# Patient Record
Sex: FEMALE | Race: WHITE | NOT HISPANIC OR LATINO | URBAN - METROPOLITAN AREA
[De-identification: names, ages, dates, MRNs, and addresses within clinical notes are randomized per-mention and may not be internally consistent; named-entity substitution may affect disease eponyms.]

---

## 2021-08-17 ENCOUNTER — INPATIENT (INPATIENT)
Facility: HOSPITAL | Age: 66
LOS: 2 days | Discharge: HOME | End: 2021-08-20
Attending: INTERNAL MEDICINE | Admitting: INTERNAL MEDICINE
Payer: MEDICARE

## 2021-08-17 VITALS
DIASTOLIC BLOOD PRESSURE: 86 MMHG | TEMPERATURE: 98 F | OXYGEN SATURATION: 100 % | HEART RATE: 98 BPM | SYSTOLIC BLOOD PRESSURE: 127 MMHG | RESPIRATION RATE: 18 BRPM

## 2021-08-17 LAB
ALBUMIN SERPL ELPH-MCNC: 4.2 G/DL — SIGNIFICANT CHANGE UP (ref 3.3–5)
ALP SERPL-CCNC: 81 U/L — SIGNIFICANT CHANGE UP (ref 40–120)
ALT FLD-CCNC: 28 U/L — SIGNIFICANT CHANGE UP (ref 4–33)
ANION GAP SERPL CALC-SCNC: 16 MMOL/L — HIGH (ref 7–14)
AST SERPL-CCNC: 22 U/L — SIGNIFICANT CHANGE UP (ref 4–32)
BASOPHILS # BLD AUTO: 0 K/UL — SIGNIFICANT CHANGE UP (ref 0–0.2)
BASOPHILS NFR BLD AUTO: 0 % — SIGNIFICANT CHANGE UP (ref 0–2)
BILIRUB SERPL-MCNC: 0.6 MG/DL — SIGNIFICANT CHANGE UP (ref 0.2–1.2)
BLOOD GAS VENOUS COMPREHENSIVE RESULT: SIGNIFICANT CHANGE UP
BUN SERPL-MCNC: 31 MG/DL — HIGH (ref 7–23)
CALCIUM SERPL-MCNC: 11.1 MG/DL — HIGH (ref 8.4–10.5)
CHLORIDE SERPL-SCNC: 92 MMOL/L — LOW (ref 98–107)
CO2 SERPL-SCNC: 23 MMOL/L — SIGNIFICANT CHANGE UP (ref 22–31)
CREAT SERPL-MCNC: 1.08 MG/DL — SIGNIFICANT CHANGE UP (ref 0.5–1.3)
EOSINOPHIL # BLD AUTO: 0 K/UL — SIGNIFICANT CHANGE UP (ref 0–0.5)
EOSINOPHIL NFR BLD AUTO: 0 % — SIGNIFICANT CHANGE UP (ref 0–6)
GLUCOSE SERPL-MCNC: 226 MG/DL — HIGH (ref 70–99)
HCT VFR BLD CALC: 45 % — SIGNIFICANT CHANGE UP (ref 34.5–45)
HGB BLD-MCNC: 15.1 G/DL — SIGNIFICANT CHANGE UP (ref 11.5–15.5)
IANC: 20.25 K/UL — HIGH (ref 1.5–8.5)
LYMPHOCYTES # BLD AUTO: 2.52 K/UL — SIGNIFICANT CHANGE UP (ref 1–3.3)
LYMPHOCYTES # BLD AUTO: 9.7 % — LOW (ref 13–44)
MCHC RBC-ENTMCNC: 29.1 PG — SIGNIFICANT CHANGE UP (ref 27–34)
MCHC RBC-ENTMCNC: 33.6 GM/DL — SIGNIFICANT CHANGE UP (ref 32–36)
MCV RBC AUTO: 86.7 FL — SIGNIFICANT CHANGE UP (ref 80–100)
MONOCYTES # BLD AUTO: 1.38 K/UL — HIGH (ref 0–0.9)
MONOCYTES NFR BLD AUTO: 5.3 % — SIGNIFICANT CHANGE UP (ref 2–14)
NEUTROPHILS # BLD AUTO: 21.41 K/UL — HIGH (ref 1.8–7.4)
NEUTROPHILS NFR BLD AUTO: 82.3 % — HIGH (ref 43–77)
PLATELET # BLD AUTO: 435 K/UL — HIGH (ref 150–400)
POTASSIUM SERPL-MCNC: 4 MMOL/L — SIGNIFICANT CHANGE UP (ref 3.5–5.3)
POTASSIUM SERPL-SCNC: 4 MMOL/L — SIGNIFICANT CHANGE UP (ref 3.5–5.3)
PROT SERPL-MCNC: 6.7 G/DL — SIGNIFICANT CHANGE UP (ref 6–8.3)
RBC # BLD: 5.19 M/UL — SIGNIFICANT CHANGE UP (ref 3.8–5.2)
RBC # FLD: 13.4 % — SIGNIFICANT CHANGE UP (ref 10.3–14.5)
SODIUM SERPL-SCNC: 131 MMOL/L — LOW (ref 135–145)
WBC # BLD: 26.02 K/UL — HIGH (ref 3.8–10.5)
WBC # FLD AUTO: 26.02 K/UL — HIGH (ref 3.8–10.5)

## 2021-08-17 PROCEDURE — 71046 X-RAY EXAM CHEST 2 VIEWS: CPT | Mod: 26

## 2021-08-17 RX ORDER — MAGNESIUM SULFATE 500 MG/ML
2 VIAL (ML) INJECTION ONCE
Refills: 0 | Status: COMPLETED | OUTPATIENT
Start: 2021-08-17 | End: 2021-08-17

## 2021-08-17 RX ORDER — SODIUM CHLORIDE 9 MG/ML
2000 INJECTION, SOLUTION INTRAVENOUS ONCE
Refills: 0 | Status: COMPLETED | OUTPATIENT
Start: 2021-08-17 | End: 2021-08-17

## 2021-08-17 RX ORDER — PROCHLORPERAZINE MALEATE 5 MG
10 TABLET ORAL ONCE
Refills: 0 | Status: COMPLETED | OUTPATIENT
Start: 2021-08-17 | End: 2021-08-17

## 2021-08-17 RX ORDER — PROCHLORPERAZINE MALEATE 5 MG
10 TABLET ORAL ONCE
Refills: 0 | Status: DISCONTINUED | OUTPATIENT
Start: 2021-08-17 | End: 2021-08-17

## 2021-08-17 RX ADMIN — SODIUM CHLORIDE 2000 MILLILITER(S): 9 INJECTION, SOLUTION INTRAVENOUS at 20:05

## 2021-08-17 RX ADMIN — Medication 10 MILLIGRAM(S): at 20:47

## 2021-08-17 NOTE — ED PROVIDER NOTE - ATTENDING CONTRIBUTION TO CARE
agree with resident note    "66F hx Breast CA (on biologics) p/w nausea, vomiting. Reports intermittent episodes of n/v, typically post therapy (most recently several weeks ago). Endorses several days of nausea w/ inability to tolerate PO. Endorses mild diffuse abd pain a/w vomiting, + stooling nl + nl urination. Denies symptoms otherwise. Has not trialed antiemetics at home. Denies fevers."    PE: uncomfortable in pain, VSS, CTAB/L; s1 s2 no m/r/g abd soft/NT/ND ext: no edema    Imp: pt states has had this very frequently at least 3-4 times a year; abd benign; will check labs treat for N/V and reassess

## 2021-08-17 NOTE — ED PROVIDER NOTE - OBJECTIVE STATEMENT
66F hx Breast CA (on biologics) p/w nausea, vomiting. Reports intermittent episodes of n/v, typically post therapy (most recently several weeks ago). Endorses several days of nausea w/ inability to tolerate PO. Endorses mild diffuse abd pain a/w vomiting, + stooling nl + nl urination. Denies symptoms otherwise. Has not trialed antiemetics at home. Denies fevers. 66F hx Breast CA (on Herceptin, Perjeta) p/w nausea, vomiting. Reports intermittent episodes of n/v, typically post therapy (most recently several weeks ago). Endorses several days of nausea w/ inability to tolerate PO. Endorses mild diffuse abd pain a/w vomiting, + stooling nl + nl urination. Denies symptoms otherwise. Has not trialed antiemetics at home. Denies fevers.    PMD and oncologist in [place], [place]. PMD: Dr. Iris Fisher. Onc: Dr. Johanna Moss

## 2021-08-17 NOTE — ED PROVIDER NOTE - PROGRESS NOTE DETAILS
usiv placed. Blood drawn. will start fluids. oral temp 98.9 Anastacio Fritz MD. time of this progress note does not represent time ofa ctual events.  pt was signed out to me pending ct a/p w/ iv contrast. pt still not tolerating PO. pt ct showing colitis and esophagitis. ordered for flagyl, cipro. pt abd is mildly distended, mildly tender diffusely but no signs of a surgical abd. admitted to medicine. hospitalist requesting maintenance fluids (NS @100cc/hr) and protonix 40 mg iv x1 for the esophagitis.

## 2021-08-17 NOTE — ED ADULT NURSE NOTE - OBJECTIVE STATEMENT
Pt rec'd in 4, A&Ox4, c/o vomiting for the past few days, denies diarrhea, denies abd pain, LBM 4-5 days ago, which pt states is normal for her. C/o feeling weak and dehydrated. Pt states she's unable to eat or drink anything due to vomiting.

## 2021-08-17 NOTE — ED PROVIDER NOTE - PHYSICAL EXAMINATION
Gen: WDWN, appears dehydrated   HEENT: EOMI, no nasal discharge, mucous membranes dry  CV: sinus tach, +S1/S2, no M/R/G  Resp: CTAB, no W/R/R  GI: Abdomen soft non-distended, mild epigastric ttp, no masses  MSK: No open wounds, no bruising, no LE edema  Neuro: A&Ox4, following commands, moving all four extremities spontaneously  Psych: appropriate mood, affect

## 2021-08-17 NOTE — ED ADULT TRIAGE NOTE - CHIEF COMPLAINT QUOTE
PATIENT STATES THAT IN THE PAST 24 HOURS PATIENT HAS EATEN 2 CRACKERS AND 2 SIPS OF WATER. SHE C/O NAUSEA, DIZZINESS,ABDOMINAL PAIN AND DEHYDRATION.

## 2021-08-18 DIAGNOSIS — K20.90 ESOPHAGITIS, UNSPECIFIED WITHOUT BLEEDING: ICD-10-CM

## 2021-08-18 DIAGNOSIS — E11.9 TYPE 2 DIABETES MELLITUS WITHOUT COMPLICATIONS: ICD-10-CM

## 2021-08-18 DIAGNOSIS — K52.9 NONINFECTIVE GASTROENTERITIS AND COLITIS, UNSPECIFIED: ICD-10-CM

## 2021-08-18 DIAGNOSIS — E78.5 HYPERLIPIDEMIA, UNSPECIFIED: ICD-10-CM

## 2021-08-18 DIAGNOSIS — I10 ESSENTIAL (PRIMARY) HYPERTENSION: ICD-10-CM

## 2021-08-18 DIAGNOSIS — F32.9 MAJOR DEPRESSIVE DISORDER, SINGLE EPISODE, UNSPECIFIED: ICD-10-CM

## 2021-08-18 DIAGNOSIS — S62.102S FRACTURE OF UNSPECIFIED CARPAL BONE, LEFT WRIST, SEQUELA: Chronic | ICD-10-CM

## 2021-08-18 DIAGNOSIS — M79.605 PAIN IN LEFT LEG: ICD-10-CM

## 2021-08-18 DIAGNOSIS — E87.1 HYPO-OSMOLALITY AND HYPONATREMIA: ICD-10-CM

## 2021-08-18 LAB
APPEARANCE UR: CLEAR — SIGNIFICANT CHANGE UP
BILIRUB UR-MCNC: NEGATIVE — SIGNIFICANT CHANGE UP
BLOOD GAS VENOUS COMPREHENSIVE RESULT: SIGNIFICANT CHANGE UP
BLOOD GAS VENOUS COMPREHENSIVE RESULT: SIGNIFICANT CHANGE UP
COLOR SPEC: SIGNIFICANT CHANGE UP
DIFF PNL FLD: NEGATIVE — SIGNIFICANT CHANGE UP
GLUCOSE BLDC GLUCOMTR-MCNC: 135 MG/DL — HIGH (ref 70–99)
GLUCOSE BLDC GLUCOMTR-MCNC: 136 MG/DL — HIGH (ref 70–99)
GLUCOSE UR QL: ABNORMAL
KETONES UR-MCNC: ABNORMAL
LEUKOCYTE ESTERASE UR-ACNC: ABNORMAL
NITRITE UR-MCNC: NEGATIVE — SIGNIFICANT CHANGE UP
PH UR: 6.5 — SIGNIFICANT CHANGE UP (ref 5–8)
PROT UR-MCNC: ABNORMAL
SARS-COV-2 RNA SPEC QL NAA+PROBE: SIGNIFICANT CHANGE UP
SP GR SPEC: 1.02 — SIGNIFICANT CHANGE UP (ref 1.01–1.02)
UROBILINOGEN FLD QL: SIGNIFICANT CHANGE UP

## 2021-08-18 PROCEDURE — 99223 1ST HOSP IP/OBS HIGH 75: CPT

## 2021-08-18 PROCEDURE — 74177 CT ABD & PELVIS W/CONTRAST: CPT | Mod: 26

## 2021-08-18 RX ORDER — BUPRENORPHINE AND NALOXONE 2; .5 MG/1; MG/1
4 TABLET SUBLINGUAL AT BEDTIME
Refills: 0 | Status: DISCONTINUED | OUTPATIENT
Start: 2021-08-18 | End: 2021-08-18

## 2021-08-18 RX ORDER — CHOLECALCIFEROL (VITAMIN D3) 125 MCG
1 CAPSULE ORAL
Qty: 0 | Refills: 0 | DISCHARGE

## 2021-08-18 RX ORDER — DEXTROSE 50 % IN WATER 50 %
25 SYRINGE (ML) INTRAVENOUS ONCE
Refills: 0 | Status: DISCONTINUED | OUTPATIENT
Start: 2021-08-18 | End: 2021-08-20

## 2021-08-18 RX ORDER — CITALOPRAM 10 MG/1
40 TABLET, FILM COATED ORAL DAILY
Refills: 0 | Status: DISCONTINUED | OUTPATIENT
Start: 2021-08-18 | End: 2021-08-20

## 2021-08-18 RX ORDER — CARVEDILOL PHOSPHATE 80 MG/1
3.12 CAPSULE, EXTENDED RELEASE ORAL EVERY 12 HOURS
Refills: 0 | Status: DISCONTINUED | OUTPATIENT
Start: 2021-08-18 | End: 2021-08-18

## 2021-08-18 RX ORDER — GLUCAGON INJECTION, SOLUTION 0.5 MG/.1ML
1 INJECTION, SOLUTION SUBCUTANEOUS ONCE
Refills: 0 | Status: DISCONTINUED | OUTPATIENT
Start: 2021-08-18 | End: 2021-08-20

## 2021-08-18 RX ORDER — SODIUM CHLORIDE 9 MG/ML
1000 INJECTION INTRAMUSCULAR; INTRAVENOUS; SUBCUTANEOUS
Refills: 0 | Status: DISCONTINUED | OUTPATIENT
Start: 2021-08-18 | End: 2021-08-18

## 2021-08-18 RX ORDER — ATORVASTATIN CALCIUM 80 MG/1
20 TABLET, FILM COATED ORAL AT BEDTIME
Refills: 0 | Status: DISCONTINUED | OUTPATIENT
Start: 2021-08-18 | End: 2021-08-20

## 2021-08-18 RX ORDER — DEXTROSE 50 % IN WATER 50 %
15 SYRINGE (ML) INTRAVENOUS ONCE
Refills: 0 | Status: DISCONTINUED | OUTPATIENT
Start: 2021-08-18 | End: 2021-08-20

## 2021-08-18 RX ORDER — ONDANSETRON 8 MG/1
4 TABLET, FILM COATED ORAL EVERY 8 HOURS
Refills: 0 | Status: DISCONTINUED | OUTPATIENT
Start: 2021-08-18 | End: 2021-08-20

## 2021-08-18 RX ORDER — ONDANSETRON 8 MG/1
4 TABLET, FILM COATED ORAL ONCE
Refills: 0 | Status: COMPLETED | OUTPATIENT
Start: 2021-08-18 | End: 2021-08-18

## 2021-08-18 RX ORDER — INSULIN LISPRO 100/ML
VIAL (ML) SUBCUTANEOUS AT BEDTIME
Refills: 0 | Status: DISCONTINUED | OUTPATIENT
Start: 2021-08-18 | End: 2021-08-20

## 2021-08-18 RX ORDER — DEXTROSE 50 % IN WATER 50 %
12.5 SYRINGE (ML) INTRAVENOUS ONCE
Refills: 0 | Status: DISCONTINUED | OUTPATIENT
Start: 2021-08-18 | End: 2021-08-20

## 2021-08-18 RX ORDER — AMITRIPTYLINE HCL 25 MG
25 TABLET ORAL AT BEDTIME
Refills: 0 | Status: DISCONTINUED | OUTPATIENT
Start: 2021-08-18 | End: 2021-08-20

## 2021-08-18 RX ORDER — LANOLIN ALCOHOL/MO/W.PET/CERES
3 CREAM (GRAM) TOPICAL AT BEDTIME
Refills: 0 | Status: DISCONTINUED | OUTPATIENT
Start: 2021-08-18 | End: 2021-08-20

## 2021-08-18 RX ORDER — SODIUM CHLORIDE 9 MG/ML
1000 INJECTION INTRAMUSCULAR; INTRAVENOUS; SUBCUTANEOUS
Refills: 0 | Status: DISCONTINUED | OUTPATIENT
Start: 2021-08-18 | End: 2021-08-20

## 2021-08-18 RX ORDER — AMITRIPTYLINE HCL 25 MG
1 TABLET ORAL
Qty: 0 | Refills: 0 | DISCHARGE

## 2021-08-18 RX ORDER — PANTOPRAZOLE SODIUM 20 MG/1
40 TABLET, DELAYED RELEASE ORAL ONCE
Refills: 0 | Status: COMPLETED | OUTPATIENT
Start: 2021-08-18 | End: 2021-08-18

## 2021-08-18 RX ORDER — PANTOPRAZOLE SODIUM 20 MG/1
1 TABLET, DELAYED RELEASE ORAL
Qty: 0 | Refills: 0 | DISCHARGE

## 2021-08-18 RX ORDER — CHOLECALCIFEROL (VITAMIN D3) 125 MCG
1000 CAPSULE ORAL DAILY
Refills: 0 | Status: DISCONTINUED | OUTPATIENT
Start: 2021-08-18 | End: 2021-08-20

## 2021-08-18 RX ORDER — BUPRENORPHINE 10 UG/H
0 PATCH, EXTENDED RELEASE TRANSDERMAL
Qty: 0 | Refills: 0 | DISCHARGE

## 2021-08-18 RX ORDER — BUPRENORPHINE AND NALOXONE 2; .5 MG/1; MG/1
0.5 TABLET SUBLINGUAL AT BEDTIME
Refills: 0 | Status: DISCONTINUED | OUTPATIENT
Start: 2021-08-18 | End: 2021-08-18

## 2021-08-18 RX ORDER — SODIUM CHLORIDE 9 MG/ML
1000 INJECTION, SOLUTION INTRAVENOUS
Refills: 0 | Status: DISCONTINUED | OUTPATIENT
Start: 2021-08-18 | End: 2021-08-20

## 2021-08-18 RX ORDER — CARVEDILOL PHOSPHATE 80 MG/1
12.5 CAPSULE, EXTENDED RELEASE ORAL EVERY 12 HOURS
Refills: 0 | Status: DISCONTINUED | OUTPATIENT
Start: 2021-08-18 | End: 2021-08-20

## 2021-08-18 RX ORDER — ACETAMINOPHEN 500 MG
650 TABLET ORAL EVERY 6 HOURS
Refills: 0 | Status: DISCONTINUED | OUTPATIENT
Start: 2021-08-18 | End: 2021-08-20

## 2021-08-18 RX ORDER — PANTOPRAZOLE SODIUM 20 MG/1
40 TABLET, DELAYED RELEASE ORAL
Refills: 0 | Status: DISCONTINUED | OUTPATIENT
Start: 2021-08-18 | End: 2021-08-20

## 2021-08-18 RX ORDER — CITALOPRAM 10 MG/1
1 TABLET, FILM COATED ORAL
Qty: 0 | Refills: 0 | DISCHARGE

## 2021-08-18 RX ORDER — METRONIDAZOLE 500 MG
500 TABLET ORAL EVERY 8 HOURS
Refills: 0 | Status: DISCONTINUED | OUTPATIENT
Start: 2021-08-18 | End: 2021-08-20

## 2021-08-18 RX ORDER — INSULIN LISPRO 100/ML
VIAL (ML) SUBCUTANEOUS
Refills: 0 | Status: DISCONTINUED | OUTPATIENT
Start: 2021-08-18 | End: 2021-08-20

## 2021-08-18 RX ORDER — CIPROFLOXACIN LACTATE 400MG/40ML
400 VIAL (ML) INTRAVENOUS EVERY 12 HOURS
Refills: 0 | Status: DISCONTINUED | OUTPATIENT
Start: 2021-08-18 | End: 2021-08-20

## 2021-08-18 RX ORDER — ATORVASTATIN CALCIUM 80 MG/1
1 TABLET, FILM COATED ORAL
Qty: 0 | Refills: 0 | DISCHARGE

## 2021-08-18 RX ORDER — SODIUM CHLORIDE 9 MG/ML
1000 INJECTION INTRAMUSCULAR; INTRAVENOUS; SUBCUTANEOUS ONCE
Refills: 0 | Status: COMPLETED | OUTPATIENT
Start: 2021-08-18 | End: 2021-08-18

## 2021-08-18 RX ORDER — METRONIDAZOLE 500 MG
500 TABLET ORAL ONCE
Refills: 0 | Status: COMPLETED | OUTPATIENT
Start: 2021-08-18 | End: 2021-08-18

## 2021-08-18 RX ORDER — BUPRENORPHINE AND NALOXONE 2; .5 MG/1; MG/1
1 TABLET SUBLINGUAL
Refills: 0 | Status: DISCONTINUED | OUTPATIENT
Start: 2021-08-18 | End: 2021-08-20

## 2021-08-18 RX ORDER — CIPROFLOXACIN LACTATE 400MG/40ML
400 VIAL (ML) INTRAVENOUS ONCE
Refills: 0 | Status: COMPLETED | OUTPATIENT
Start: 2021-08-18 | End: 2021-08-18

## 2021-08-18 RX ORDER — BUPRENORPHINE AND NALOXONE 2; .5 MG/1; MG/1
2 TABLET SUBLINGUAL AT BEDTIME
Refills: 0 | Status: DISCONTINUED | OUTPATIENT
Start: 2021-08-18 | End: 2021-08-20

## 2021-08-18 RX ORDER — METFORMIN HYDROCHLORIDE 850 MG/1
1 TABLET ORAL
Qty: 0 | Refills: 0 | DISCHARGE

## 2021-08-18 RX ORDER — CARVEDILOL PHOSPHATE 80 MG/1
1 CAPSULE, EXTENDED RELEASE ORAL
Qty: 0 | Refills: 0 | DISCHARGE

## 2021-08-18 RX ADMIN — Medication 3 MILLIGRAM(S): at 21:42

## 2021-08-18 RX ADMIN — Medication 200 MILLIGRAM(S): at 19:36

## 2021-08-18 RX ADMIN — PANTOPRAZOLE SODIUM 40 MILLIGRAM(S): 20 TABLET, DELAYED RELEASE ORAL at 05:26

## 2021-08-18 RX ADMIN — Medication 100 MILLIGRAM(S): at 06:36

## 2021-08-18 RX ADMIN — SODIUM CHLORIDE 1000 MILLILITER(S): 9 INJECTION INTRAMUSCULAR; INTRAVENOUS; SUBCUTANEOUS at 05:26

## 2021-08-18 RX ADMIN — Medication 1 TABLET(S): at 19:36

## 2021-08-18 RX ADMIN — Medication 200 MILLIGRAM(S): at 05:26

## 2021-08-18 RX ADMIN — CARVEDILOL PHOSPHATE 3.12 MILLIGRAM(S): 80 CAPSULE, EXTENDED RELEASE ORAL at 06:51

## 2021-08-18 RX ADMIN — PANTOPRAZOLE SODIUM 40 MILLIGRAM(S): 20 TABLET, DELAYED RELEASE ORAL at 19:40

## 2021-08-18 RX ADMIN — SODIUM CHLORIDE 100 MILLILITER(S): 9 INJECTION INTRAMUSCULAR; INTRAVENOUS; SUBCUTANEOUS at 11:25

## 2021-08-18 RX ADMIN — CARVEDILOL PHOSPHATE 12.5 MILLIGRAM(S): 80 CAPSULE, EXTENDED RELEASE ORAL at 19:37

## 2021-08-18 RX ADMIN — BUPRENORPHINE AND NALOXONE 2 TABLET(S): 2; .5 TABLET SUBLINGUAL at 21:42

## 2021-08-18 RX ADMIN — SODIUM CHLORIDE 100 MILLILITER(S): 9 INJECTION INTRAMUSCULAR; INTRAVENOUS; SUBCUTANEOUS at 06:36

## 2021-08-18 RX ADMIN — Medication 50 GRAM(S): at 00:31

## 2021-08-18 RX ADMIN — Medication 25 MILLIGRAM(S): at 22:30

## 2021-08-18 RX ADMIN — Medication 1000 UNIT(S): at 19:41

## 2021-08-18 RX ADMIN — ATORVASTATIN CALCIUM 20 MILLIGRAM(S): 80 TABLET, FILM COATED ORAL at 21:35

## 2021-08-18 RX ADMIN — Medication 100 MILLIGRAM(S): at 21:35

## 2021-08-18 RX ADMIN — Medication 100 MILLIGRAM(S): at 15:44

## 2021-08-18 RX ADMIN — ONDANSETRON 4 MILLIGRAM(S): 8 TABLET, FILM COATED ORAL at 01:53

## 2021-08-18 NOTE — H&P ADULT - NSHPREVIEWOFSYSTEMS_GEN_ALL_CORE
REVIEW OF SYSTEMS:    CONSTITUTIONAL: No weakness, fevers or chills, no weight loss  EYES/ENT: No visual changes;  No dysphagia or odynophagia, no tinnitus  NECK: No pain or stiffness  RESPIRATORY: No cough, wheezing, hemoptysis; No shortness of breath  CARDIOVASCULAR: No chest pain or palpitations; No lower extremity edema  GASTROINTESTINAL: + abdominal pain, + Nausea and vomiting, No diarrhea,  + constipation. No melena or hematochezia.  MUSCULOSKELETAL: No joint pain, swelling, erythema or warmth, no back pain  GENITOURINARY: No dysuria, frequency or hematuria, no suprapubic pain  NEUROLOGICAL: No numbness or weakness, no headache, no syncope, no gait abnormalities   SKIN: No itching, burning, rashes, or lesions   All other review of systems is negative unless indicated above.

## 2021-08-18 NOTE — H&P ADULT - PROBLEM SELECTOR PLAN 6
- Likely secondary to hypovolemia in setting of vomiting and poor PO intake  - Continue NS for now and trend Na

## 2021-08-18 NOTE — H&P ADULT - NSHPPHYSICALEXAM_GEN_ALL_CORE
T(C): 36.9 (08-18-21 @ 09:47), Max: 37.2 (08-17-21 @ 18:14)  HR: 100 (08-18-21 @ 09:47) (98 - 140)  BP: 133/66 (08-18-21 @ 09:47) (127/86 - 178/98)  RR: 18 (08-18-21 @ 09:47) (18 - 18)  SpO2: 100% (08-18-21 @ 09:47) (97% - 100%)    GENERAL: No acute distress, well-developed  HEAD:  Atraumatic, Normocephalic  ENT: EOMI, PERRLA, conjunctiva and sclera clear, Neck supple, No JVD, moist mucosa, no pharyngeal erythema, no tonsillar enlargement or exudate  CHEST/LUNG: Clear to auscultation bilaterally; No wheeze, equal breath sounds bilaterally   HEART: Regular rate and rhythm; No murmurs, rubs, or gallops  ABDOMEN: Soft, TTP in LUQ and LLQ, Nondistended; Bowel sounds present, no organomegaly  EXTREMITIES:  2+ Peripheral Pulses, No clubbing, cyanosis, or edema  PSYCH: AAOx3, normal affect, normal behavior   NEUROLOGY: non-focal, cranial nerves intact  SKIN: Normal color, No rashes or lesions

## 2021-08-18 NOTE — H&P ADULT - NSHPLABSRESULTS_GEN_ALL_CORE
.  LABS:                         15.1   26.02 )-----------( 435      ( 17 Aug 2021 20:58 )             45.0         131<L>  |  92<L>  |  31<H>  ----------------------------<  226<H>  4.0   |  23  |  1.08    Ca    11.1<H>      17 Aug 2021 20:59  Phos  2.9       Mg     1.20         TPro  6.7  /  Alb  4.2  /  TBili  0.6  /  DBili  x   /  AST  22  /  ALT  28  /  AlkPhos  81        Urinalysis Basic - ( 18 Aug 2021 08:43 )    Color: Light Yellow / Appearance: Clear / S.017 / pH: x  Gluc: x / Ketone: Small  / Bili: Negative / Urobili: <2 mg/dL   Blood: x / Protein: Trace / Nitrite: Negative   Leuk Esterase: Moderate / RBC: 2 /HPF / WBC 7 /HPF   Sq Epi: x / Non Sq Epi: 2 /HPF / Bacteria: Negative                RADIOLOGY, EKG & ADDITIONAL TESTS: Reviewed.

## 2021-08-18 NOTE — PHARMACOTHERAPY INTERVENTION NOTE - COMMENTS
Medication history is complete and was verified with patient, [facility] and [facility]. Medication list updated in Outpatient Medication Record (OMR). Please call spectra [phone] if you have any questions.
Per [facility] (reference #[id number]), patient receives medical marijuana dispensed through [employer]. Per [facility], prescription last dispensed 7/28/21 for a 12 day supply.

## 2021-08-18 NOTE — H&P ADULT - HISTORY OF PRESENT ILLNESS
65 yo F h/o  HTN, DM w/ neuropathy, HLD, breast CA presenting with abdominal pain and vomiting. Pt states she has been having generalized cramping abdominal pian x 4 days with associated NBNB vomiting last episode 2 days ago. She denies any diarrhea or melena, last BM was 4 days ago. She has had poor appetite and decreased PO intake. She denies any fevers but has occasional chills.     In ED pt was given Ciprofloxacin, Flagyl, Zofran, Protonix, Compazine, Mg 2mg IV and 2 L IVFs  VS:  133/66  100  98.4  18  100% on RA

## 2021-08-18 NOTE — H&P ADULT - NSICDXFAMILYHX_GEN_ALL_CORE_FT
FAMILY HISTORY:  Father  Still living? No  FH: myocardial infarction, Age at diagnosis: 51-60    Mother  Still living? Unknown  FH: kidney cancer, Age at diagnosis: Age Unknown

## 2021-08-18 NOTE — H&P ADULT - PROBLEM SELECTOR PLAN 1
- Continue Cipro+ Flagyl  - Pt not having BMs. If pt starts having diarrhea, would check c diff, Stool PCR, Stool Cx  - Tylenol prn pain, Zofran prn vomiting  - PO challenge  - Lactate elevated 2.4. Continue IVFs. Repeat lactate in afternoon

## 2021-08-19 LAB
A1C WITH ESTIMATED AVERAGE GLUCOSE RESULT: 5.9 % — HIGH (ref 4–5.6)
ALBUMIN SERPL ELPH-MCNC: 3.4 G/DL — SIGNIFICANT CHANGE UP (ref 3.3–5)
ALP SERPL-CCNC: 57 U/L — SIGNIFICANT CHANGE UP (ref 40–120)
ALT FLD-CCNC: 13 U/L — SIGNIFICANT CHANGE UP (ref 4–33)
ANION GAP SERPL CALC-SCNC: 12 MMOL/L — SIGNIFICANT CHANGE UP (ref 7–14)
ANION GAP SERPL CALC-SCNC: 12 MMOL/L — SIGNIFICANT CHANGE UP (ref 7–14)
AST SERPL-CCNC: 10 U/L — SIGNIFICANT CHANGE UP (ref 4–32)
BASOPHILS # BLD AUTO: 0.03 K/UL — SIGNIFICANT CHANGE UP (ref 0–0.2)
BASOPHILS # BLD AUTO: 0.03 K/UL — SIGNIFICANT CHANGE UP (ref 0–0.2)
BASOPHILS NFR BLD AUTO: 0.7 % — SIGNIFICANT CHANGE UP (ref 0–2)
BASOPHILS NFR BLD AUTO: 0.7 % — SIGNIFICANT CHANGE UP (ref 0–2)
BILIRUB SERPL-MCNC: 0.5 MG/DL — SIGNIFICANT CHANGE UP (ref 0.2–1.2)
BUN SERPL-MCNC: 14 MG/DL — SIGNIFICANT CHANGE UP (ref 7–23)
BUN SERPL-MCNC: 14 MG/DL — SIGNIFICANT CHANGE UP (ref 7–23)
CALCIUM SERPL-MCNC: 8.4 MG/DL — SIGNIFICANT CHANGE UP (ref 8.4–10.5)
CALCIUM SERPL-MCNC: 8.7 MG/DL — SIGNIFICANT CHANGE UP (ref 8.4–10.5)
CHLORIDE SERPL-SCNC: 101 MMOL/L — SIGNIFICANT CHANGE UP (ref 98–107)
CHLORIDE SERPL-SCNC: 103 MMOL/L — SIGNIFICANT CHANGE UP (ref 98–107)
CO2 SERPL-SCNC: 20 MMOL/L — LOW (ref 22–31)
CO2 SERPL-SCNC: 24 MMOL/L — SIGNIFICANT CHANGE UP (ref 22–31)
COVID-19 SPIKE DOMAIN AB INTERP: POSITIVE
COVID-19 SPIKE DOMAIN ANTIBODY RESULT: 102 U/ML — HIGH
CREAT SERPL-MCNC: 0.92 MG/DL — SIGNIFICANT CHANGE UP (ref 0.5–1.3)
CREAT SERPL-MCNC: 0.99 MG/DL — SIGNIFICANT CHANGE UP (ref 0.5–1.3)
CULTURE RESULTS: SIGNIFICANT CHANGE UP
EOSINOPHIL # BLD AUTO: 0.1 K/UL — SIGNIFICANT CHANGE UP (ref 0–0.5)
EOSINOPHIL # BLD AUTO: 0.12 K/UL — SIGNIFICANT CHANGE UP (ref 0–0.5)
EOSINOPHIL NFR BLD AUTO: 2.5 % — SIGNIFICANT CHANGE UP (ref 0–6)
EOSINOPHIL NFR BLD AUTO: 2.9 % — SIGNIFICANT CHANGE UP (ref 0–6)
ESTIMATED AVERAGE GLUCOSE: 123 — SIGNIFICANT CHANGE UP
GLUCOSE BLDC GLUCOMTR-MCNC: 110 MG/DL — HIGH (ref 70–99)
GLUCOSE BLDC GLUCOMTR-MCNC: 115 MG/DL — HIGH (ref 70–99)
GLUCOSE BLDC GLUCOMTR-MCNC: 129 MG/DL — HIGH (ref 70–99)
GLUCOSE BLDC GLUCOMTR-MCNC: 149 MG/DL — HIGH (ref 70–99)
GLUCOSE SERPL-MCNC: 129 MG/DL — HIGH (ref 70–99)
GLUCOSE SERPL-MCNC: 137 MG/DL — HIGH (ref 70–99)
HCT VFR BLD CALC: 29.6 % — LOW (ref 34.5–45)
HCT VFR BLD CALC: 30.6 % — LOW (ref 34.5–45)
HCV AB S/CO SERPL IA: 4.58 S/CO — HIGH (ref 0–0.99)
HCV AB SERPL-IMP: ABNORMAL
HGB BLD-MCNC: 9.5 G/DL — LOW (ref 11.5–15.5)
HGB BLD-MCNC: 9.8 G/DL — LOW (ref 11.5–15.5)
IANC: 1.99 K/UL — SIGNIFICANT CHANGE UP (ref 1.5–8.5)
IANC: 2.22 K/UL — SIGNIFICANT CHANGE UP (ref 1.5–8.5)
IMM GRANULOCYTES NFR BLD AUTO: 0.5 % — SIGNIFICANT CHANGE UP (ref 0–1.5)
IMM GRANULOCYTES NFR BLD AUTO: 0.5 % — SIGNIFICANT CHANGE UP (ref 0–1.5)
LACTATE SERPL-SCNC: 1.3 MMOL/L — SIGNIFICANT CHANGE UP (ref 0.5–2)
LYMPHOCYTES # BLD AUTO: 1.12 K/UL — SIGNIFICANT CHANGE UP (ref 1–3.3)
LYMPHOCYTES # BLD AUTO: 1.43 K/UL — SIGNIFICANT CHANGE UP (ref 1–3.3)
LYMPHOCYTES # BLD AUTO: 27.5 % — SIGNIFICANT CHANGE UP (ref 13–44)
LYMPHOCYTES # BLD AUTO: 35.5 % — SIGNIFICANT CHANGE UP (ref 13–44)
MAGNESIUM SERPL-MCNC: 1.4 MG/DL — LOW (ref 1.6–2.6)
MAGNESIUM SERPL-MCNC: 1.9 MG/DL — SIGNIFICANT CHANGE UP (ref 1.6–2.6)
MCHC RBC-ENTMCNC: 29.1 PG — SIGNIFICANT CHANGE UP (ref 27–34)
MCHC RBC-ENTMCNC: 29.2 PG — SIGNIFICANT CHANGE UP (ref 27–34)
MCHC RBC-ENTMCNC: 32 GM/DL — SIGNIFICANT CHANGE UP (ref 32–36)
MCHC RBC-ENTMCNC: 32.1 GM/DL — SIGNIFICANT CHANGE UP (ref 32–36)
MCV RBC AUTO: 90.5 FL — SIGNIFICANT CHANGE UP (ref 80–100)
MCV RBC AUTO: 91.1 FL — SIGNIFICANT CHANGE UP (ref 80–100)
MONOCYTES # BLD AUTO: 0.46 K/UL — SIGNIFICANT CHANGE UP (ref 0–0.9)
MONOCYTES # BLD AUTO: 0.56 K/UL — SIGNIFICANT CHANGE UP (ref 0–0.9)
MONOCYTES NFR BLD AUTO: 11.4 % — SIGNIFICANT CHANGE UP (ref 2–14)
MONOCYTES NFR BLD AUTO: 13.8 % — SIGNIFICANT CHANGE UP (ref 2–14)
NEUTROPHILS # BLD AUTO: 1.99 K/UL — SIGNIFICANT CHANGE UP (ref 1.8–7.4)
NEUTROPHILS # BLD AUTO: 2.22 K/UL — SIGNIFICANT CHANGE UP (ref 1.8–7.4)
NEUTROPHILS NFR BLD AUTO: 49.4 % — SIGNIFICANT CHANGE UP (ref 43–77)
NEUTROPHILS NFR BLD AUTO: 54.6 % — SIGNIFICANT CHANGE UP (ref 43–77)
NRBC # BLD: 0 /100 WBCS — SIGNIFICANT CHANGE UP
NRBC # BLD: 0 /100 WBCS — SIGNIFICANT CHANGE UP
NRBC # FLD: 0 K/UL — SIGNIFICANT CHANGE UP
NRBC # FLD: 0 K/UL — SIGNIFICANT CHANGE UP
PHOSPHATE SERPL-MCNC: 2.2 MG/DL — LOW (ref 2.5–4.5)
PHOSPHATE SERPL-MCNC: 2.5 MG/DL — SIGNIFICANT CHANGE UP (ref 2.5–4.5)
PLATELET # BLD AUTO: 209 K/UL — SIGNIFICANT CHANGE UP (ref 150–400)
PLATELET # BLD AUTO: 229 K/UL — SIGNIFICANT CHANGE UP (ref 150–400)
POTASSIUM SERPL-MCNC: 4 MMOL/L — SIGNIFICANT CHANGE UP (ref 3.5–5.3)
POTASSIUM SERPL-MCNC: 4.3 MMOL/L — SIGNIFICANT CHANGE UP (ref 3.5–5.3)
POTASSIUM SERPL-SCNC: 4 MMOL/L — SIGNIFICANT CHANGE UP (ref 3.5–5.3)
POTASSIUM SERPL-SCNC: 4.3 MMOL/L — SIGNIFICANT CHANGE UP (ref 3.5–5.3)
PROT SERPL-MCNC: 5.3 G/DL — LOW (ref 6–8.3)
RBC # BLD: 3.27 M/UL — LOW (ref 3.8–5.2)
RBC # BLD: 3.36 M/UL — LOW (ref 3.8–5.2)
RBC # FLD: 13.6 % — SIGNIFICANT CHANGE UP (ref 10.3–14.5)
RBC # FLD: 13.8 % — SIGNIFICANT CHANGE UP (ref 10.3–14.5)
SARS-COV-2 IGG+IGM SERPL QL IA: 102 U/ML — HIGH
SARS-COV-2 IGG+IGM SERPL QL IA: POSITIVE
SODIUM SERPL-SCNC: 135 MMOL/L — SIGNIFICANT CHANGE UP (ref 135–145)
SODIUM SERPL-SCNC: 137 MMOL/L — SIGNIFICANT CHANGE UP (ref 135–145)
SPECIMEN SOURCE: SIGNIFICANT CHANGE UP
WBC # BLD: 4.03 K/UL — SIGNIFICANT CHANGE UP (ref 3.8–10.5)
WBC # BLD: 4.07 K/UL — SIGNIFICANT CHANGE UP (ref 3.8–10.5)
WBC # FLD AUTO: 4.03 K/UL — SIGNIFICANT CHANGE UP (ref 3.8–10.5)
WBC # FLD AUTO: 4.07 K/UL — SIGNIFICANT CHANGE UP (ref 3.8–10.5)

## 2021-08-19 PROCEDURE — 99223 1ST HOSP IP/OBS HIGH 75: CPT

## 2021-08-19 RX ORDER — POTASSIUM PHOSPHATE, MONOBASIC POTASSIUM PHOSPHATE, DIBASIC 236; 224 MG/ML; MG/ML
15 INJECTION, SOLUTION INTRAVENOUS ONCE
Refills: 0 | Status: COMPLETED | OUTPATIENT
Start: 2021-08-19 | End: 2021-08-19

## 2021-08-19 RX ORDER — MAGNESIUM SULFATE 500 MG/ML
2 VIAL (ML) INJECTION ONCE
Refills: 0 | Status: COMPLETED | OUTPATIENT
Start: 2021-08-19 | End: 2021-08-19

## 2021-08-19 RX ADMIN — Medication 100 MILLIGRAM(S): at 05:26

## 2021-08-19 RX ADMIN — Medication 100 MILLIGRAM(S): at 21:46

## 2021-08-19 RX ADMIN — Medication 25 MILLIGRAM(S): at 21:46

## 2021-08-19 RX ADMIN — Medication 1 TABLET(S): at 05:26

## 2021-08-19 RX ADMIN — Medication 200 MILLIGRAM(S): at 18:13

## 2021-08-19 RX ADMIN — BUPRENORPHINE AND NALOXONE 2 TABLET(S): 2; .5 TABLET SUBLINGUAL at 22:01

## 2021-08-19 RX ADMIN — BUPRENORPHINE AND NALOXONE 1 TABLET(S): 2; .5 TABLET SUBLINGUAL at 08:25

## 2021-08-19 RX ADMIN — POTASSIUM PHOSPHATE, MONOBASIC POTASSIUM PHOSPHATE, DIBASIC 62.5 MILLIMOLE(S): 236; 224 INJECTION, SOLUTION INTRAVENOUS at 09:45

## 2021-08-19 RX ADMIN — Medication 1000 UNIT(S): at 18:19

## 2021-08-19 RX ADMIN — CARVEDILOL PHOSPHATE 12.5 MILLIGRAM(S): 80 CAPSULE, EXTENDED RELEASE ORAL at 18:14

## 2021-08-19 RX ADMIN — PANTOPRAZOLE SODIUM 40 MILLIGRAM(S): 20 TABLET, DELAYED RELEASE ORAL at 06:27

## 2021-08-19 RX ADMIN — Medication 100 MILLIGRAM(S): at 13:56

## 2021-08-19 RX ADMIN — Medication 50 GRAM(S): at 09:45

## 2021-08-19 RX ADMIN — CITALOPRAM 40 MILLIGRAM(S): 10 TABLET, FILM COATED ORAL at 13:54

## 2021-08-19 RX ADMIN — Medication 200 MILLIGRAM(S): at 05:27

## 2021-08-19 RX ADMIN — ATORVASTATIN CALCIUM 20 MILLIGRAM(S): 80 TABLET, FILM COATED ORAL at 21:45

## 2021-08-19 RX ADMIN — Medication 1 TABLET(S): at 18:14

## 2021-08-19 RX ADMIN — CARVEDILOL PHOSPHATE 12.5 MILLIGRAM(S): 80 CAPSULE, EXTENDED RELEASE ORAL at 05:26

## 2021-08-19 NOTE — CONSULT NOTE ADULT - SUBJECTIVE AND OBJECTIVE BOX
Chief Complaint:  Patient is a 66y old  Female who presents with a chief complaint of Colitis (19 Aug 2021 12:29)      HPI:  67 yo F h/o  HTN, DM w/ neuropathy, HLD, breast CA presenting with abdominal pain and vomiting. Pt states she has been having generalized cramping abdominal pian x 4 days with associated NBNB vomiting last episode 2 days ago. She denies any diarrhea or melena, last BM was 4 days ago. She has had poor appetite and decreased PO intake. She denies any fevers but has occasional chills.   Patient has had multiple episodes of abd complaints, N/V and CT showing colitis, but pt. deferred. Pt. now was planned for colonoscopy as outpatient, but scheduled for October. No recent change to diet. No fevers, no chills. No diarrhea.  In ED pt was given Ciprofloxacin, Flagyl, Zofran, Protonix, Compazine, Mg 2mg IV and 2 L IVFs  Pt. reports no HB, dysphagia, has no BRBPR.  Her last colonoscopy was 6 years ago.    Allergies:  Ambien (Unknown)  penicillin (Unknown)      Home Medications:  amitriptyline 25 mg oral tablet: 1 tab(s) orally once a day (at bedtime) (18 Aug 2021 16:25)  atorvastatin 20 mg oral tablet: 1 tab(s) orally once a day (18 Aug 2021 16:25)  buprenorphine 8 mg sublingual tablet: 1 tab (8 mg) orally in the morning and 0.5 tab (4 mg) orally at bedtime    **Per [facility] (reference #[id number]), last dispensed on 21 for 30 day supply (18 Aug 2021 16:25)  Calcium 500+D oral tablet, chewable: 1 tab(s) orally 2 times a day (18 Aug 2021 16:25)  carvedilol 12.5 mg oral tablet: 1 tab(s) orally 2 times a day    **Per [facility] pharmacy, last filled 21 for 30 day supply (18 Aug 2021 16:25)  citalopram 40 mg oral tablet: 1 tab(s) orally once a day (18 Aug 2021 16:25)  metFORMIN 500 mg oral tablet, extended release: 1 tab(s) orally once a day (with dinner) (18 Aug 2021 16:25)  pantoprazole 40 mg oral delayed release tablet: 1 tab(s) orally 2 times a day (18 Aug 2021 16:25)  Vitamin D3: 1 tab(s) orally once a day (18 Aug 2021 16:25)        Hospital Medications:  amitriptyline 25 milliGRAM(s) Oral at bedtime  atorvastatin 20 milliGRAM(s) Oral at bedtime  buprenorphine 2 mG/naloxone 0.5 mG SL  Tablet 2 Tablet(s) SubLingual at bedtime  buprenorphine 8 mG/naloxone 2 mG SL  Tablet 1 Tablet(s) SubLingual <User Schedule>  calcium carbonate 1250 mG  + Vitamin D (OsCal 500 + D) 1 Tablet(s) Oral two times a day  carvedilol 12.5 milliGRAM(s) Oral every 12 hours  cholecalciferol 1000 Unit(s) Oral daily  ciprofloxacin   IVPB 400 milliGRAM(s) IV Intermittent every 12 hours  citalopram 40 milliGRAM(s) Oral daily  dextrose 40% Gel 15 Gram(s) Oral once  dextrose 5%. 1000 milliLiter(s) IV Continuous <Continuous>  dextrose 5%. 1000 milliLiter(s) IV Continuous <Continuous>  dextrose 50% Injectable 25 Gram(s) IV Push once  dextrose 50% Injectable 12.5 Gram(s) IV Push once  dextrose 50% Injectable 25 Gram(s) IV Push once  glucagon  Injectable 1 milliGRAM(s) IntraMuscular once  insulin lispro (ADMELOG) corrective regimen sliding scale   SubCutaneous three times a day before meals  insulin lispro (ADMELOG) corrective regimen sliding scale   SubCutaneous at bedtime  metroNIDAZOLE  IVPB 500 milliGRAM(s) IV Intermittent every 8 hours  pantoprazole    Tablet 40 milliGRAM(s) Oral before breakfast  sodium chloride 0.9%. 1000 milliLiter(s) IV Continuous <Continuous>    MEDICATIONS  (PRN):  acetaminophen   Tablet .. 650 milliGRAM(s) Oral every 6 hours PRN Temp greater or equal to 38C (100.4F), Moderate Pain (4 - 6), Severe Pain (7 - 10)  acetaminophen   Tablet .. 650 milliGRAM(s) Oral every 6 hours PRN Temp greater or equal to 38.5C (101.3F), Mild Pain (1 - 3)  aluminum hydroxide/magnesium hydroxide/simethicone Suspension 30 milliLiter(s) Oral every 6 hours PRN Dyspepsia  melatonin 3 milliGRAM(s) Oral at bedtime PRN Insomnia  ondansetron Injectable 4 milliGRAM(s) IV Push every 8 hours PRN Nausea and/or Vomiting    ___________  Active diet:  Diet, Regular:   Consistent Carbohydrate No Snacks (CSTCHO)  DASH/TLC Sodium & Cholesterol Restricted (DASH)  ___________________      PMHX/PSHX:  No pertinent past medical history    No significant past surgical history    Broken wrist, left, sequela        Family history:  FH: kidney cancer (Mother)    FH: myocardial infarction (Father)        Social History: Tobacco: [ ] yes  [x ] no  EtOH: Ex Alcoholic, but quit many years ago [ ] yes  [ ] no  Illicit drugs: denies  Lives alone    ROS:     General:  No wt loss, fevers, chills, night sweats, fatigue,   Eyes:  Good vision, no reported pain  ENT:  No sore throat, pain, runny nose, dysphagia  CV:  No pain, palpitations, hypo/hypertension  Resp:  No dyspnea, cough, tachypnea, wheezing  GI:  No pain, No nausea, No vomiting, No diarrhea, No constipation, No weight loss, No fever, No pruritis, No rectal bleeding, No tarry stools, No dysphagia,  :  No pain, bleeding, incontinence, nocturia  Muscle:  No pain, weakness  Neuro:  No weakness, tingling, memory problems  Psych:  No fatigue, insomnia, mood problems, depression  Endocrine:  No polyuria, polydipsia, cold/heat intolerance  Heme:  No petechiae, ecchymosis, easy bruisability  Skin:  No rash, tattoos, scars, edema      PHYSICAL EXAM:   Vital Signs:  Vital Signs Last 24 Hrs  T(C): 36.6 (19 Aug 2021 12:50), Max: 36.7 (18 Aug 2021 22:36)  T(F): 97.9 (19 Aug 2021 12:50), Max: 98 (18 Aug 2021 22:36)  HR: 96 (19 Aug 2021 12:50) (75 - 97)  BP: 104/70 (19 Aug 2021 12:50) (104/70 - 148/75)  BP(mean): --  RR: 18 (19 Aug 2021 12:50) (17 - 20)  SpO2: 96% (19 Aug 2021 12:50) (96% - 100%)  Daily Height in cm: 162.56 (18 Aug 2021 19:18)    Daily     GENERAL:  Appears stated age, well-groomed, well-nourished, no distress  HEENT:  NC/AT,  conjunctivae clear and pink, no thyromegaly, nodules, adenopathy, no JVD, sclera -anicteric  NECK: Supple, No mass  CHEST:  Full & symmetric excursion, no increased effort, breath sounds clear  HEART:  Regular rhythm, S1, S2, no murmur/rub/S3/S4, no abdominal bruit, no edema  ABDOMEN:  Soft, mild epig and Left-sided tenderness, non-distended, normoactive bowel sounds,  no masses ,no hepato-splenomegaly, no signs of chronic liver disease  EXTEREMITIES:  no cyanosis,clubbing or edema  SKIN:  No rash/erythema/ecchymoses/petechiae/wounds/abscess/warm/dry  NEURO:  Alert, oriented, no asterixis, no tremor, no encephalopathy  LN: No Lymphadenopathy, No Mass  RECTAL: Heme +     LABS:                        9.5    4.07  )-----------( 209      ( 19 Aug 2021 12:54 )             29.6     08-19    135  |  103  |  14  ----------------------------<  137<H>  4.3   |  20<L>  |  0.92    Ca    8.4      19 Aug 2021 12:54  Phos  2.5     -  Mg     1.90     -    TPro  5.3<L>  /  Alb  3.4  /  TBili  0.5  /  DBili  x   /  AST  10  /  ALT  13  /  AlkPhos  57  -    LIVER FUNCTIONS - ( 19 Aug 2021 06:35 )  Alb: 3.4 g/dL / Pro: 5.3 g/dL / ALK PHOS: 57 U/L / ALT: 13 U/L / AST: 10 U/L / GGT: x             Urinalysis Basic - ( 18 Aug 2021 08:43 )    Color: Light Yellow / Appearance: Clear / S.017 / pH: x  Gluc: x / Ketone: Small  / Bili: Negative / Urobili: <2 mg/dL   Blood: x / Protein: Trace / Nitrite: Negative   Leuk Esterase: Moderate / RBC: 2 /HPF / WBC 7 /HPF   Sq Epi: x / Non Sq Epi: 2 /HPF / Bacteria: Negative          Imaging:  I reviewed the CT Abdomen and Pelvis w/ IV Cont (21 @ 02:47) >    EXAM:  CT ABDOMEN AND PELVIS IC        PROCEDURE DATE:  Aug 18 2021         INTERPRETATION:  CLINICAL INFORMATION: Breast cancer. Nausea, vomiting, diffuse abdominal pain.    COMPARISON: None.    CONTRAST/COMPLICATIONS:  IV Contrast: Omnipaque [id number] cc administered   10 cc discarded  Oral Contrast: NONE  Complications: None reported at time of study completion    PROCEDURE:  CT of the Abdomen and Pelvis was performed.  Sagittal and coronal reformats were performed.    FINDINGS:  LOWER CHEST: Bibasilar linear/subsegmental atelectasis. Central venous catheter tip noted in the distal SVC. Edematous wall thickening of the esophagus. Small hiatal hernia.      LIVER: Within normal limits.  BILE DUCTS: Normal caliber.  GALLBLADDER: Within normallimits.  SPLEEN: Within normal limits.  PANCREAS: Within normal limits.  ADRENALS: Within normal limits.  KIDNEYS/URETERS: Bilateral subcentimeter hypodensities, too small to characterize. No hydronephrosis.    BLADDER: Within normal limits.  REPRODUCTIVE ORGANS: No pelvic mass.    BOWEL: Appendix is normal. Marked colonic wall thickening with surrounding fat stranding in the region of the splenic flexure and descending colon. Mild wall thickening of the mid to distal transverse colon and the sigmoid colon. Moderate amount of stool in the right hemicolon.  PERITONEUM: Trace pelvic fluid.  VESSELS: Atherosclerotic changes.  RETROPERITONEUM/LYMPH NODES: 1.7 x 0.9 cm azygoesophageal recess lymph node (2-21, 601-59)  ABDOMINAL WALL: Small fat-containing umbilical hernia.  BONES: Degenerative changes. Indeterminate 1.4 cm sclerotic lesion in the L1 vertebral body.    IMPRESSION:      1. Colitis of the transverse through sigmoid colon, including marked wall thickening and surrounding stranding involving the descending colon.  2. Edematous wall thickening of the partially visualized esophagus suggestive of esophagitis.  3. Indeterminate 1.4 cm sclerotic lesion in the L1 vertebral body. Recommend correlation with prior imaging if available. Otherwise follow-up bone scan can be obtained for further evaluation.              
"HPI:  65 yo F h/o  HTN, DM w/ neuropathy, HLD, breast CA presenting with abdominal pain and vomiting. Pt states she has been having generalized cramping abdominal pian x 4 days with associated NBNB vomiting last episode 2 days ago. She denies any diarrhea or melena, last BM was 4 days ago. She has had poor appetite and decreased PO intake. She denies any fevers but has occasional chills.     In ED pt was given Ciprofloxacin, Flagyl, Zofran, Protonix, Compazine, Mg 2mg IV and 2 L IVFs  VS:  133/66  100  98.4  18  100% on RA (18 Aug 2021 11:05)"    Above reviewed. 65 yo F with DM, HLD, Breast CA, initially with abd pain. Patient has had longstanding episodes of abd complaints, N/V. Was planned for endoscopy as outpatient, but scheduled for October. No recent change to diet. No fevers, no chills. No diarrhea. Feels somewhat improved with current hospital treatment. ID called for further eval.    PAST MEDICAL & SURGICAL HISTORY:  No pertinent past medical history    Broken wrist, left, sequela    Allergies    Ambien (Unknown)  penicillin (Unknown)    ANTIMICROBIALS:  ciprofloxacin   IVPB 400 every 12 hours  metroNIDAZOLE  IVPB 500 every 8 hours    OTHER MEDS:  acetaminophen   Tablet .. 650 milliGRAM(s) Oral every 6 hours PRN  acetaminophen   Tablet .. 650 milliGRAM(s) Oral every 6 hours PRN  aluminum hydroxide/magnesium hydroxide/simethicone Suspension 30 milliLiter(s) Oral every 6 hours PRN  amitriptyline 25 milliGRAM(s) Oral at bedtime  atorvastatin 20 milliGRAM(s) Oral at bedtime  buprenorphine 2 mG/naloxone 0.5 mG SL  Tablet 2 Tablet(s) SubLingual at bedtime  buprenorphine 8 mG/naloxone 2 mG SL  Tablet 1 Tablet(s) SubLingual <User Schedule>  calcium carbonate 1250 mG  + Vitamin D (OsCal 500 + D) 1 Tablet(s) Oral two times a day  carvedilol 12.5 milliGRAM(s) Oral every 12 hours  cholecalciferol 1000 Unit(s) Oral daily  citalopram 40 milliGRAM(s) Oral daily  dextrose 40% Gel 15 Gram(s) Oral once  dextrose 5%. 1000 milliLiter(s) IV Continuous <Continuous>  dextrose 5%. 1000 milliLiter(s) IV Continuous <Continuous>  dextrose 50% Injectable 25 Gram(s) IV Push once  dextrose 50% Injectable 12.5 Gram(s) IV Push once  dextrose 50% Injectable 25 Gram(s) IV Push once  glucagon  Injectable 1 milliGRAM(s) IntraMuscular once  insulin lispro (ADMELOG) corrective regimen sliding scale   SubCutaneous three times a day before meals  insulin lispro (ADMELOG) corrective regimen sliding scale   SubCutaneous at bedtime  melatonin 3 milliGRAM(s) Oral at bedtime PRN  ondansetron Injectable 4 milliGRAM(s) IV Push every 8 hours PRN  pantoprazole    Tablet 40 milliGRAM(s) Oral before breakfast  sodium chloride 0.9%. 1000 milliLiter(s) IV Continuous <Continuous>    SOCIAL HISTORY: No tobacco, no alcohol, no illicit drugs    FAMILY HISTORY:  FH: kidney cancer (Mother)    FH: myocardial infarction (Father)    Drug Dosing Weight  Height (cm): 162.6 (18 Aug 2021 19:18)  Weight (kg): 85.2 (18 Aug 2021 19:18)  BMI (kg/m2): 32.2 (18 Aug 2021 19:18)  BSA (m2): 1.9 (18 Aug 2021 19:18)    PE:    Vital Signs Last 24 Hrs  T(C): 36.4 (19 Aug 2021 05:23), Max: 36.7 (18 Aug 2021 22:36)  T(F): 97.5 (19 Aug 2021 05:23), Max: 98 (18 Aug 2021 22:36)  HR: 90 (19 Aug 2021 05:23) (75 - 97)  BP: 140/69 (19 Aug 2021 05:23) (110/78 - 148/75)  RR: 17 (19 Aug 2021 05:23) (17 - 20)  SpO2: 98% (19 Aug 2021 05:23) (98% - 100%)    Gen: AOx3, NAD, non-toxic  CV: S1+S2 normal, nontachycardic  Resp: Clear bilat, no resp distress, no crackles/wheezes  Abd: Soft, nontender, +BS  Ext: No LE edema, no wounds  : No Georges  IV/Skin: No thrombophlebitis  Msk: No low back pain, no arthralgias, no joint swelling  Neuro: No sensory deficits, no motor deficits    LABS:                        9.8    4.03  )-----------( 229      ( 19 Aug 2021 06:35 )             30.6     -    137  |  101  |  14  ----------------------------<  129<H>  4.0   |  24  |  0.99    Ca    8.7      19 Aug 2021 06:35  Phos  2.2       Mg     1.40         TPro  5.3<L>  /  Alb  3.4  /  TBili  0.5  /  DBili  x   /  AST  10  /  ALT  13  /  AlkPhos  57  -    Urinalysis Basic - ( 18 Aug 2021 08:43 )    Color: Light Yellow / Appearance: Clear / S.017 / pH: x  Gluc: x / Ketone: Small  / Bili: Negative / Urobili: <2 mg/dL   Blood: x / Protein: Trace / Nitrite: Negative   Leuk Esterase: Moderate / RBC: 2 /HPF / WBC 7 /HPF   Sq Epi: x / Non Sq Epi: 2 /HPF / Bacteria: Negative    MICROBIOLOGY:    COVID neg    RADIOLOGY:     CT:    IMPRESSION:      1. Colitis of the transverse through sigmoid colon, including marked wall thickening and surrounding stranding involving the descending colon.  2. Edematous wall thickening of the partially visualized esophagus suggestive of esophagitis.  3. Indeterminate 1.4 cm sclerotic lesion in the L1 vertebral body. Recommend correlation with prior imaging if available. Otherwise follow-up bone scan can be obtained for further evaluation.

## 2021-08-19 NOTE — CONSULT NOTE ADULT - ASSESSMENT
67 yo F with DM, HLD, Breast CA, initially with abd pain  Leukocytosis, no fever  CT with colitis transverse, sigmoid, descending colon  WBC normalized presently after antibiotic treatment  No recent change in diet  No high volume diarrhea to suggest C diff  Prior has had episode abd pain and N/V  Overall,  1) Colitis  - Possible infections colitis/gastroenteritis considering apparent response to antibiotic; would still concern noninfectious etiologies  - Cipro 500mg q 12  - Flagyl 500mg q 8  - Check GI PCR panel  - Check C diff PCR if watery diarrhea noted  - GI eval--any role for endoscopy for diagnosis?  - Monitor symptoms, monitor for improvement  2) Leukocytosis  - Trend to normal   - F/U pending cultures  - Check BCXs if fevers or not improving  3) DM  - Further care per primary team    Jamel Cruz MD  Pager [phone]  From 5pm-9am, and on weekends call [phone]
65 yo F h/o  HTN, DM w/ neuropathy, HLD, breast CA presenting with abdominal pain and vomiting. Pt states she has been having generalized cramping abdominal pian x 4 days with associated NBNB vomiting last episode 2 days ago. She denies any diarrhea or melena, last BM was 4 days ago. She has had poor appetite and decreased PO intake. She denies any fevers but has occasional chills.   Patient has had multiple episodes of abd complaints, N/V and CT showing colitis, but pt. deferred. Pt. now was planned for colonoscopy as outpatient, but scheduled for October. No recent change to diet. No fevers, no chills. No diarrhea.  In ED pt was given Ciprofloxacin, Flagyl, Zofran, Protonix, Compazine, Mg 2mg IV and 2 L IVFs  Pt. reports no HB, dysphagia, has no BRBPR.  Her last colonoscopy was 6 years ago.  She has occult positive stool and has colitis on CT.  R/O Colitis: Inflammatory Vs. Infectious  I doubt pt. has Infectious colitis as she has had no diarrhea or fever. Her initial CBC does not correlate with the subsequent 2 CBC's today (Her initial WBC with the Neut.  could not possibly drop so rapidly within hours and therefore must have been a lab. error!)  Etiology to her colitis is most likely inflammatory.  She needs a colonoscopy and an EGD, but pt. wants to go home to [place] and expresses the understanding that she will need to follow with her GI ASAP.  I offered her to do the procedures, but she pleasantly declined.  Would D/C the Antibiotics and start her on Asacol 800 mg PO TID and have her follow up with her GI.  Would start Probiotics with VSL3 one Cap. PO BID  Avoid ASA or NSAID's    I had a prolonged conversation with pt. and family re. likely diagnosis and plan who verbalizes clear understanding,  Differential diagnosis and plan of care discussed with patient after the evaluation.   Advanced care planning options discussed.   Pain assessed and judicious use of narcotics when appropriate was discussed.  Importance of Fall prevention discussed.  Counseling on Smoking and Alcohol cessation was offered when appropriate.  Counseling on Diet, exercise, and medication compliance was done.    Yemi Bains M.D.  Gastroenterology and Hepatic Diseases  Cell: [phone]

## 2021-08-20 VITALS
TEMPERATURE: 98 F | DIASTOLIC BLOOD PRESSURE: 61 MMHG | SYSTOLIC BLOOD PRESSURE: 144 MMHG | OXYGEN SATURATION: 96 % | HEART RATE: 82 BPM

## 2021-08-20 LAB
ANION GAP SERPL CALC-SCNC: 8 MMOL/L — SIGNIFICANT CHANGE UP (ref 7–14)
BASOPHILS # BLD AUTO: 0.08 K/UL — SIGNIFICANT CHANGE UP (ref 0–0.2)
BASOPHILS NFR BLD AUTO: 2.6 % — HIGH (ref 0–2)
BUN SERPL-MCNC: 12 MG/DL — SIGNIFICANT CHANGE UP (ref 7–23)
CALCIUM SERPL-MCNC: 8.5 MG/DL — SIGNIFICANT CHANGE UP (ref 8.4–10.5)
CHLORIDE SERPL-SCNC: 106 MMOL/L — SIGNIFICANT CHANGE UP (ref 98–107)
CO2 SERPL-SCNC: 22 MMOL/L — SIGNIFICANT CHANGE UP (ref 22–31)
CREAT SERPL-MCNC: 0.93 MG/DL — SIGNIFICANT CHANGE UP (ref 0.5–1.3)
EOSINOPHIL # BLD AUTO: 0.04 K/UL — SIGNIFICANT CHANGE UP (ref 0–0.5)
EOSINOPHIL NFR BLD AUTO: 1.3 % — SIGNIFICANT CHANGE UP (ref 0–6)
GLUCOSE BLDC GLUCOMTR-MCNC: 155 MG/DL — HIGH (ref 70–99)
GLUCOSE BLDC GLUCOMTR-MCNC: 164 MG/DL — HIGH (ref 70–99)
GLUCOSE SERPL-MCNC: 107 MG/DL — HIGH (ref 70–99)
HCT VFR BLD CALC: 28.3 % — LOW (ref 34.5–45)
HGB BLD-MCNC: 9.2 G/DL — LOW (ref 11.5–15.5)
IANC: 0.5 K/UL — LOW (ref 1.5–8.5)
LYMPHOCYTES # BLD AUTO: 1.39 K/UL — SIGNIFICANT CHANGE UP (ref 1–3.3)
LYMPHOCYTES # BLD AUTO: 48 % — HIGH (ref 13–44)
MAGNESIUM SERPL-MCNC: 1.8 MG/DL — SIGNIFICANT CHANGE UP (ref 1.6–2.6)
MCHC RBC-ENTMCNC: 29.5 PG — SIGNIFICANT CHANGE UP (ref 27–34)
MCHC RBC-ENTMCNC: 32.5 GM/DL — SIGNIFICANT CHANGE UP (ref 32–36)
MCV RBC AUTO: 90.7 FL — SIGNIFICANT CHANGE UP (ref 80–100)
MONOCYTES # BLD AUTO: 0.34 K/UL — SIGNIFICANT CHANGE UP (ref 0–0.9)
MONOCYTES NFR BLD AUTO: 11.7 % — SIGNIFICANT CHANGE UP (ref 2–14)
NEUTROPHILS # BLD AUTO: 0.68 K/UL — LOW (ref 1.8–7.4)
NEUTROPHILS NFR BLD AUTO: 22.1 % — LOW (ref 43–77)
PHOSPHATE SERPL-MCNC: 2.8 MG/DL — SIGNIFICANT CHANGE UP (ref 2.5–4.5)
PLATELET # BLD AUTO: 225 K/UL — SIGNIFICANT CHANGE UP (ref 150–400)
POTASSIUM SERPL-MCNC: 4.3 MMOL/L — SIGNIFICANT CHANGE UP (ref 3.5–5.3)
POTASSIUM SERPL-SCNC: 4.3 MMOL/L — SIGNIFICANT CHANGE UP (ref 3.5–5.3)
RBC # BLD: 3.12 M/UL — LOW (ref 3.8–5.2)
RBC # FLD: 13.7 % — SIGNIFICANT CHANGE UP (ref 10.3–14.5)
SODIUM SERPL-SCNC: 136 MMOL/L — SIGNIFICANT CHANGE UP (ref 135–145)
WBC # BLD: 2.89 K/UL — LOW (ref 3.8–10.5)
WBC # FLD AUTO: 2.89 K/UL — LOW (ref 3.8–10.5)

## 2021-08-20 PROCEDURE — 99232 SBSQ HOSP IP/OBS MODERATE 35: CPT

## 2021-08-20 RX ORDER — MESALAMINE 400 MG
2 TABLET, DELAYED RELEASE (ENTERIC COATED) ORAL
Qty: 180 | Refills: 0
Start: 2021-08-20 | End: 2021-09-18

## 2021-08-20 RX ORDER — MESALAMINE 400 MG
800 TABLET, DELAYED RELEASE (ENTERIC COATED) ORAL THREE TIMES A DAY
Refills: 0 | Status: DISCONTINUED | OUTPATIENT
Start: 2021-08-20 | End: 2021-08-20

## 2021-08-20 RX ADMIN — BUPRENORPHINE AND NALOXONE 1 TABLET(S): 2; .5 TABLET SUBLINGUAL at 08:16

## 2021-08-20 RX ADMIN — Medication 1: at 12:28

## 2021-08-20 RX ADMIN — Medication 1 TABLET(S): at 06:48

## 2021-08-20 RX ADMIN — CITALOPRAM 40 MILLIGRAM(S): 10 TABLET, FILM COATED ORAL at 12:25

## 2021-08-20 RX ADMIN — Medication 100 MILLIGRAM(S): at 06:48

## 2021-08-20 RX ADMIN — CARVEDILOL PHOSPHATE 12.5 MILLIGRAM(S): 80 CAPSULE, EXTENDED RELEASE ORAL at 06:48

## 2021-08-20 RX ADMIN — Medication 1: at 08:17

## 2021-08-20 RX ADMIN — Medication 200 MILLIGRAM(S): at 06:47

## 2021-08-20 RX ADMIN — PANTOPRAZOLE SODIUM 40 MILLIGRAM(S): 20 TABLET, DELAYED RELEASE ORAL at 06:48

## 2021-08-20 NOTE — DISCHARGE NOTE NURSING/CASE MANAGEMENT/SOCIAL WORK - PATIENT PORTAL LINK FT
You can access the FollowMyHealth Patient Portal offered by [facility] by registering at the following website: [email or web address]. By joining [facility] FollowMyHealth portal, you will also be able to view your health information using other applications (apps) compatible with our system.

## 2021-08-20 NOTE — DISCHARGE NOTE PROVIDER - NSDCMRMEDTOKEN_GEN_ALL_CORE_FT
amitriptyline 25 mg oral tablet: 1 tab(s) orally once a day (at bedtime)  atorvastatin 20 mg oral tablet: 1 tab(s) orally once a day  buprenorphine 8 mg sublingual tablet: 1 tab (8 mg) orally in the morning and 0.5 tab (4 mg) orally at bedtime    **Per [facility] (reference #[id number]), last dispensed on 7/21/21 for 30 day supply  Calcium 500+D oral tablet, chewable: 1 tab(s) orally 2 times a day  carvedilol 12.5 mg oral tablet: 1 tab(s) orally 2 times a day    **Per [facility] pharmacy, last filled 6/11/21 for 30 day supply  citalopram 40 mg oral tablet: 1 tab(s) orally once a day  metFORMIN 500 mg oral tablet, extended release: 1 tab(s) orally once a day (with dinner)  pantoprazole 40 mg oral delayed release tablet: 1 tab(s) orally 2 times a day  Vitamin D3: 1 tab(s) orally once a day   amitriptyline 25 mg oral tablet: 1 tab(s) orally once a day (at bedtime)  atorvastatin 20 mg oral tablet: 1 tab(s) orally once a day  buprenorphine 8 mg sublingual tablet: 1 tab (8 mg) orally in the morning and 0.5 tab (4 mg) orally at bedtime    **Per [facility] (reference #[id number]), last dispensed on 7/21/21 for 30 day supply  Calcium 500+D oral tablet, chewable: 1 tab(s) orally 2 times a day  carvedilol 12.5 mg oral tablet: 1 tab(s) orally 2 times a day    **Per [facility] pharmacy, last filled 6/11/21 for 30 day supply  citalopram 40 mg oral tablet: 1 tab(s) orally once a day  mesalamine 400 mg oral delayed release capsule: 2 cap(s) orally 3 times a day  metFORMIN 500 mg oral tablet, extended release: 1 tab(s) orally once a day (with dinner)  pantoprazole 40 mg oral delayed release tablet: 1 tab(s) orally 2 times a day  Vitamin D3: 1 tab(s) orally once a day

## 2021-08-20 NOTE — DISCHARGE NOTE PROVIDER - HOSPITAL COURSE
67 yo F h/o  HTN, DM w/ neuropathy, HLD, breast CA presenting with abdominal pain and vomiting. Pt states she has been having generalized cramping abdominal pian x 4 days with associated NBNB vomiting last episode 2 days ago. She denies any diarrhea or melena, last BM was 4 days ago. She has had poor appetite and decreased PO intake. She denies any fevers but has occasional chills.     Patient has had multiple episodes of abd complaints, N/V and CT showing colitis, but pt. deferred. Pt. now was planned for colonoscopy as outpatient, but scheduled for October. No recent change to diet. No fevers, no chills. No diarrhea. In ED pt was given Ciprofloxacin, Flagyl, Zofran, Protonix, Compazine, Mg 2mg IV and 2 L IVFs. Pt. reports no HB, dysphagia, has no BRBPR. Her last colonoscopy was 6 years ago.  She has occult positive stool and has colitis on CT. R/O Colitis: Inflammatory Vs. Infectious. Etiology to her colitis is most likely inflammatory. Patient needs a colonoscopy and an EGD, but pt. wants to go home to [place] and expresses the understanding that she will need to follow with her GI ASAP. GI recommends to discontinue antibiotics and start patient on Asacol 800 mg PO TID and have her follow up with her GI. Patient is to start Probiotics with VSL3 one Cap. PO BID. Avoid ASA or NSAID's.    Patient is medically stable for discharge on 8/20/2021 per attending Dr. Roa.

## 2021-08-20 NOTE — DISCHARGE NOTE NURSING/CASE MANAGEMENT/SOCIAL WORK - NSDCPEFALRISK_GEN_ALL_CORE
For information on Fall & injury Prevention, visit [email or web address]

## 2021-08-20 NOTE — PROGRESS NOTE ADULT - ASSESSMENT
65 yo F with DM, HLD, Breast CA, initially with abd pain  Leukocytosis, no fever  CT with colitis transverse, sigmoid, descending colon  WBC normalized presently after antibiotic treatment  No recent change in diet  No high volume diarrhea to suggest C diff  Prior has had episode abd pain and N/V  Overall,  1) Colitis  - GI suspecting inflammatory process colitis; on my end lower suspicion for infectious colitis as well, reasonable to hold abx and monitor  - DC Cipro/Flagyl  - Check C diff PCR and GI PCR if watery diarrhea noted  - F/U GI  - Monitor symptoms, monitor for improvement  2) Leukocytosis  - Trend to normal   - F/U pending cultures  3) DM  - Further care per primary team    Signing off. Please call with further questions or change in status.    Jamel Cruz MD  Pager [phone]  From 5pm-9am, and on weekends call [phone]
67 yo F h/o  HTN, DM w/ neuropathy, HLD, breast CA presenting with abdominal pain and vomiting. Pt states she has been having generalized cramping abdominal pian x 4 days with associated NBNB vomiting last episode 2 days ago. She denies any diarrhea or melena, last BM was 4 days ago. She has had poor appetite and decreased PO intake. She denies any fevers but has occasional chills.   Patient has had multiple episodes of abd complaints, N/V and CT showing colitis, but pt. deferred. Pt. now was planned for colonoscopy as outpatient, but scheduled for October. No recent change to diet. No fevers, no chills. No diarrhea.  In ED pt was given Ciprofloxacin, Flagyl, Zofran, Protonix, Compazine, Mg 2mg IV and 2 L IVFs  Pt. reports no HB, dysphagia, has no BRBPR.  Her last colonoscopy was 6 years ago.  She has occult positive stool and has colitis on CT.  R/O Colitis: Inflammatory Vs. Infectious  I doubt pt. has Infectious colitis as she has had no diarrhea or fever. Her initial CBC does not correlate with the subsequent 2 CBC's today (Her initial WBC with the Neut.  could not possibly drop so rapidly within hours and therefore must have been a lab. error!)  Etiology to her colitis is most likely inflammatory.  She needs a colonoscopy and an EGD, but pt. wants to go home to [place] and expresses the understanding that she will need to follow with her GI ASAP.  I offered her to do the procedures, but she pleasantly declined.  Would D/C the Antibiotics and start her on Asacol 800 mg PO TID and have her follow up with her GI.  Would start Probiotics with VSL3 one Cap. PO BID  Avoid ASA or NSAID's  Pt. to f/u with het oncologist ASAP for her low WBC.    I had a prolonged conversation with pt. and family re. likely diagnosis and plan who verbalizes clear understanding,  Differential diagnosis and plan of care discussed with patient after the evaluation.   Advanced care planning options discussed.   Pain assessed and judicious use of narcotics when appropriate was discussed.  Importance of Fall prevention discussed.  Counseling on Smoking and Alcohol cessation was offered when appropriate.  Counseling on Diet, exercise, and medication compliance was done.    Yemi Bains M.D.  Gastroenterology and Hepatic Diseases  Cell: [phone]      
67 yo F h/o  HTN, DM w/ neuropathy, HLD, breast CA presenting with abdominal pain and vomiting.

## 2021-08-20 NOTE — PROGRESS NOTE ADULT - SUBJECTIVE AND OBJECTIVE BOX
CC: F/U for Colitis    Saw/spoke to patient. No fevers, no chills. No new complaints.    Allergies  Ambien (Unknown)  penicillin (Unknown)    ANTIMICROBIALS:  Off    PE:    Vital Signs Last 24 Hrs  T(C): 36.4 (20 Aug 2021 13:28), Max: 36.8 (20 Aug 2021 06:56)  T(F): 97.6 (20 Aug 2021 13:28), Max: 98.2 (20 Aug 2021 06:56)  HR: 82 (20 Aug 2021 13:28) (73 - 83)  BP: 144/61 (20 Aug 2021 13:28) (122/74 - 144/61)  RR: 18 (20 Aug 2021 06:56) (18 - 18)  SpO2: 96% (20 Aug 2021 13:28) (96% - 98%)    Gen: AOx3, NAD, non-toxic  CV: S1+S2 normal, nontachycardic  Resp: Clear bilat, no resp distress, no crackles/wheezes  Abd: Soft, nontender, +BS  Ext: No LE edema, no wounds    LABS:                        9.2    2.89  )-----------( 225      ( 20 Aug 2021 07:29 )             28.3     08-20    136  |  106  |  12  ----------------------------<  107<H>  4.3   |  22  |  0.93    Ca    8.5      20 Aug 2021 07:29  Phos  2.8     08-20  Mg     1.80     08-20    TPro  5.3<L>  /  Alb  3.4  /  TBili  0.5  /  DBili  x   /  AST  10  /  ALT  13  /  AlkPhos  57  08-19    MICROBIOLOGY:    Clean Catch Clean Catch (Midstream)  08-18-21   <10,000 CFU/mL Normal Urogenital Sepideh     RADIOLOGY:    8/18 CT:  IMPRESSION:      1. Colitis of the transverse through sigmoid colon, including marked wall thickening and surrounding stranding involving the descending colon.  2. Edematous wall thickening of the partially visualized esophagus suggestive of esophagitis.  3. Indeterminate 1.4 cm sclerotic lesion in the L1 vertebral body. Recommend correlation with prior imaging if available. Otherwise follow-up bone scan can be obtained for further evaluation.
  Chief Complaint:  Patient is a 66y old  Female who presents with a chief complaint of Colitis (20 Aug 2021 12:34)      Interval Events:   pt. feels better  Allergies:  Ambien (Unknown)  penicillin (Unknown)        Home Medications:  amitriptyline 25 mg oral tablet: 1 tab(s) orally once a day (at bedtime) (18 Aug 2021 16:25)  atorvastatin 20 mg oral tablet: 1 tab(s) orally once a day (18 Aug 2021 16:25)  buprenorphine 8 mg sublingual tablet: 1 tab (8 mg) orally in the morning and 0.5 tab (4 mg) orally at bedtime    **Per [facility] (reference #[id number]), last dispensed on 7/21/21 for 30 day supply (18 Aug 2021 16:25)  Calcium 500+D oral tablet, chewable: 1 tab(s) orally 2 times a day (18 Aug 2021 16:25)  carvedilol 12.5 mg oral tablet: 1 tab(s) orally 2 times a day    **Per [facility] pharmacy, last filled 6/11/21 for 30 day supply (18 Aug 2021 16:25)  citalopram 40 mg oral tablet: 1 tab(s) orally once a day (18 Aug 2021 16:25)  metFORMIN 500 mg oral tablet, extended release: 1 tab(s) orally once a day (with dinner) (18 Aug 2021 16:25)  pantoprazole 40 mg oral delayed release tablet: 1 tab(s) orally 2 times a day (18 Aug 2021 16:25)  Vitamin D3: 1 tab(s) orally once a day (18 Aug 2021 16:25)        Hospital Medications:  amitriptyline 25 milliGRAM(s) Oral at bedtime  atorvastatin 20 milliGRAM(s) Oral at bedtime  buprenorphine 2 mG/naloxone 0.5 mG SL  Tablet 2 Tablet(s) SubLingual at bedtime  buprenorphine 8 mG/naloxone 2 mG SL  Tablet 1 Tablet(s) SubLingual <User Schedule>  calcium carbonate 1250 mG  + Vitamin D (OsCal 500 + D) 1 Tablet(s) Oral two times a day  carvedilol 12.5 milliGRAM(s) Oral every 12 hours  cholecalciferol 1000 Unit(s) Oral daily  citalopram 40 milliGRAM(s) Oral daily  dextrose 40% Gel 15 Gram(s) Oral once  dextrose 5%. 1000 milliLiter(s) IV Continuous <Continuous>  dextrose 5%. 1000 milliLiter(s) IV Continuous <Continuous>  dextrose 50% Injectable 25 Gram(s) IV Push once  dextrose 50% Injectable 12.5 Gram(s) IV Push once  dextrose 50% Injectable 25 Gram(s) IV Push once  glucagon  Injectable 1 milliGRAM(s) IntraMuscular once  insulin lispro (ADMELOG) corrective regimen sliding scale   SubCutaneous three times a day before meals  insulin lispro (ADMELOG) corrective regimen sliding scale   SubCutaneous at bedtime  mesalamine DR Capsule 800 milliGRAM(s) Oral three times a day  pantoprazole    Tablet 40 milliGRAM(s) Oral before breakfast  sodium chloride 0.9%. 1000 milliLiter(s) IV Continuous <Continuous>    MEDICATIONS  (PRN):  acetaminophen   Tablet .. 650 milliGRAM(s) Oral every 6 hours PRN Temp greater or equal to 38C (100.4F), Moderate Pain (4 - 6), Severe Pain (7 - 10)  acetaminophen   Tablet .. 650 milliGRAM(s) Oral every 6 hours PRN Temp greater or equal to 38.5C (101.3F), Mild Pain (1 - 3)  aluminum hydroxide/magnesium hydroxide/simethicone Suspension 30 milliLiter(s) Oral every 6 hours PRN Dyspepsia  melatonin 3 milliGRAM(s) Oral at bedtime PRN Insomnia  ondansetron Injectable 4 milliGRAM(s) IV Push every 8 hours PRN Nausea and/or Vomiting    ___________  Active diet:  Diet, Regular:   Consistent Carbohydrate No Snacks (CSTCHO)  DASH/TLC Sodium & Cholesterol Restricted (DASH)  ___________________        ROS  GI: [- ] Nausea, [- ] vomiting, [ -]abdominal pain    PHYSICAL EXAM:   Vital Signs:  Vital Signs Last 24 Hrs  T(C): 36.4 (20 Aug 2021 13:28), Max: 36.8 (20 Aug 2021 06:56)  T(F): 97.6 (20 Aug 2021 13:28), Max: 98.2 (20 Aug 2021 06:56)  HR: 82 (20 Aug 2021 13:28) (73 - 82)  BP: 144/61 (20 Aug 2021 13:28) (122/74 - 144/61)  BP(mean): --  RR: 18 (20 Aug 2021 06:56) (18 - 18)  SpO2: 96% (20 Aug 2021 13:28) (96% - 98%)  Daily     Daily     GENERAL:  Appears stated age, well-groomed, well-nourished, no distress  HEENT:  NC/AT,  conjunctivae clear and pink, no thyromegaly, nodules, adenopathy, no JVD, sclera -anicteric  NECK: Supple, No masses  CHEST:  Full & symmetric excursion, no increased effort, breath sounds clear  HEART:  Regular rhythm, S1, S2, no murmur/rub/S3/S4, no abdominal bruit, no edema  ABDOMEN:  Soft, non-tender, non-distended, normoactive bowel sounds,  no masses ,no hepato-splenomegaly, no signs of chronic liver disease  EXTEREMITIES:  no cyanosis,clubbing or edema  SKIN:  No rash/erythema/ecchymoses/petechiae/wounds/abscess/warm/dry  LN: No lymphadenopathy, No masses  NEURO:  Alert, oriented, no asterixis, no tremor, no encephalopathy    LABS:                        9.2    2.89  )-----------( 225      ( 20 Aug 2021 07:29 )             28.3     08-20    136  |  106  |  12  ----------------------------<  107<H>  4.3   |  22  |  0.93    Ca    8.5      20 Aug 2021 07:29  Phos  2.8     08-20  Mg     1.80     08-20    TPro  5.3<L>  /  Alb  3.4  /  TBili  0.5  /  DBili  x   /  AST  10  /  ALT  13  /  AlkPhos  57  08-19    LIVER FUNCTIONS - ( 19 Aug 2021 06:35 )  Alb: 3.4 g/dL / Pro: 5.3 g/dL / ALK PHOS: 57 U/L / ALT: 13 U/L / AST: 10 U/L / GGT: x                   Imaging:          
    SUBJECTIVE / OVERNIGHT EVENTS:pt seen and examined      MEDICATIONS  (STANDING):  amitriptyline 25 milliGRAM(s) Oral at bedtime  atorvastatin 20 milliGRAM(s) Oral at bedtime  buprenorphine 2 mG/naloxone 0.5 mG SL  Tablet 2 Tablet(s) SubLingual at bedtime  buprenorphine 8 mG/naloxone 2 mG SL  Tablet 1 Tablet(s) SubLingual <User Schedule>  calcium carbonate 1250 mG  + Vitamin D (OsCal 500 + D) 1 Tablet(s) Oral two times a day  carvedilol 12.5 milliGRAM(s) Oral every 12 hours  cholecalciferol 1000 Unit(s) Oral daily  ciprofloxacin   IVPB 400 milliGRAM(s) IV Intermittent every 12 hours  citalopram 40 milliGRAM(s) Oral daily  dextrose 40% Gel 15 Gram(s) Oral once  dextrose 5%. 1000 milliLiter(s) (50 mL/Hr) IV Continuous <Continuous>  dextrose 5%. 1000 milliLiter(s) (100 mL/Hr) IV Continuous <Continuous>  dextrose 50% Injectable 25 Gram(s) IV Push once  dextrose 50% Injectable 12.5 Gram(s) IV Push once  dextrose 50% Injectable 25 Gram(s) IV Push once  glucagon  Injectable 1 milliGRAM(s) IntraMuscular once  insulin lispro (ADMELOG) corrective regimen sliding scale   SubCutaneous three times a day before meals  insulin lispro (ADMELOG) corrective regimen sliding scale   SubCutaneous at bedtime  metroNIDAZOLE  IVPB 500 milliGRAM(s) IV Intermittent every 8 hours  pantoprazole    Tablet 40 milliGRAM(s) Oral before breakfast  sodium chloride 0.9%. 1000 milliLiter(s) (100 mL/Hr) IV Continuous <Continuous>    MEDICATIONS  (PRN):  acetaminophen   Tablet .. 650 milliGRAM(s) Oral every 6 hours PRN Temp greater or equal to 38C (100.4F), Moderate Pain (4 - 6), Severe Pain (7 - 10)  acetaminophen   Tablet .. 650 milliGRAM(s) Oral every 6 hours PRN Temp greater or equal to 38.5C (101.3F), Mild Pain (1 - 3)  aluminum hydroxide/magnesium hydroxide/simethicone Suspension 30 milliLiter(s) Oral every 6 hours PRN Dyspepsia  melatonin 3 milliGRAM(s) Oral at bedtime PRN Insomnia  ondansetron Injectable 4 milliGRAM(s) IV Push every 8 hours PRN Nausea and/or Vomiting      Vital Signs Last 24 Hrs  T(C): 36.4 (19 Aug 2021 21:27), Max: 36.6 (19 Aug 2021 12:50)  T(F): 97.6 (19 Aug 2021 21:27), Max: 97.9 (19 Aug 2021 12:50)  HR: 73 (19 Aug 2021 21:27) (73 - 96)  BP: 122/74 (19 Aug 2021 21:27) (104/70 - 140/69)  BP(mean): --  RR: 18 (19 Aug 2021 21:27) (17 - 18)  SpO2: 98% (19 Aug 2021 21:) (96% - 98%)  CAPILLARY BLOOD GLUCOSE      POCT Blood Glucose.: 149 mg/dL (19 Aug 2021 22:35)  POCT Blood Glucose.: 110 mg/dL (19 Aug 2021 17:39)  POCT Blood Glucose.: 129 mg/dL (19 Aug 2021 12:41)  POCT Blood Glucose.: 115 mg/dL (19 Aug 2021 08:06)    I&O's Summary      Constitutional: No fever, fatigue  Skin: No rash.  Eyes: No recent vision problems or eye pain.  ENT: No congestion, ear pain, or sore throat.  Cardiovascular: No chest pain or palpation.  Respiratory: No cough, shortness of breath, congestion, or wheezing.  Gastrointestinal: No abdominal pain, nausea, vomiting, or diarrhea.  Genitourinary: No dysuria.  Musculoskeletal: No joint swelling.  Neurologic: No headache.    PHYSICAL EXAM:  GENERAL: NAD  EYES: EOMI, PERRLA  NECK: Supple, No JVD  CHEST/LUNG: cta mallory  HEART:  S1 , S2 +  ABDOMEN:soft , bs+   EXTREMITIES:  no edema  NEUROLOGY:alert awake      LABS:                        9.5    4.07  )-----------( 209      ( 19 Aug 2021 12:54 )             29.6     08-19    135  |  103  |  14  ----------------------------<  137<H>  4.3   |  20<L>  |  0.92    Ca    8.4      19 Aug 2021 12:54  Phos  2.5     08-19  Mg     1.90     08-19    TPro  5.3<L>  /  Alb  3.4  /  TBili  0.5  /  DBili  x   /  AST  10  /  ALT  13  /  AlkPhos  57  08-19          Urinalysis Basic - ( 18 Aug 2021 08:43 )    Color: Light Yellow / Appearance: Clear / S.017 / pH: x  Gluc: x / Ketone: Small  / Bili: Negative / Urobili: <2 mg/dL   Blood: x / Protein: Trace / Nitrite: Negative   Leuk Esterase: Moderate / RBC: 2 /HPF / WBC 7 /HPF   Sq Epi: x / Non Sq Epi: 2 /HPF / Bacteria: Negative        RADIOLOGY & ADDITIONAL TESTS:    Imaging Personally Reviewed:    Consultant(s) Notes Reviewed:      Care Discussed with Consultants/Other Providers:

## 2021-08-20 NOTE — DISCHARGE NOTE PROVIDER - NSDCCPCAREPLAN_GEN_ALL_CORE_FT
PRINCIPAL DISCHARGE DIAGNOSIS  Diagnosis: Acute colitis  Assessment and Plan of Treatment: You were admitted in the hospital for abdominal pain. You had a CT scan showing colitis. You were seen by infectious disease team and was started on antibiotics for colitis. You were seen by gastroenterologist. You are to follow up with your gastroenterologist outpatient as soon as possible for a colonoscopy and endoscopy outpatient. You are to start taking Asacol 800mg oral three times a day and probiotics with VSL3 one cap twice a day (over the counter). Avoid aspirin and NSAIDs at this time.      SECONDARY DISCHARGE DIAGNOSES  Diagnosis: Diabetes  Assessment and Plan of Treatment: Continue taking medications for diabetes and follow up with your primary care physician and/or endocrinologist.    Diagnosis: HLD (hyperlipidemia)  Assessment and Plan of Treatment: Low fat diet, exercise daily and continue current medications. Follow up with primary care physician and cardiologist for management.    Diagnosis: Hypertension  Assessment and Plan of Treatment: Low sodium and fat diet, continue anti-hypertensive medications, and follow up with primary care physician.

## 2021-09-07 NOTE — ED ADULT NURSE NOTE - NS ED NURSE REPORT GIVEN TO FT
[facility] OPAL   Preadmission Testing    Name: Marya Nolasco  : 1965  Patient Phone: [phone] (home)     Procedure: Colonoscopy   Date of Procedure: 21  Surgeon: Klever Rouse MD    Ht:  5' 10\" (177.8 cm)  Wt: Weight: (!) 310 lb (140.6 kg)  Wt method: Allergies: Allergies   Allergen Reactions    Sulfa Antibiotics            Latex Allergy Screening Tool  Have you ever had a reaction to or been told by a physician that you have an allergy to latex or natural rubber?: No    There were no vitals filed for this visit. No LMP recorded. Patient has had a hysterectomy. Do you take blood thinners? [] Yes    [x] No         Instructed to stop blood thinners prior to procedure? [] Yes    [] No      [x] N/A   Do you have sleep apnea? [x] Yes    [] No     Do you have acid reflux ? [x] Yes    [] No     Do you have  hiatal hernia? [] Yes    [x] No    Do you ever experience motion sickness? [] Yes    [x] No     Have you had a respiratory infection or sore throat in last 4 weeks before surgery? [] Yes    [x] No     Do you have poorly controlled asthma or COPD? Difficulty with intubation in past? [] Yes    [x] No      [] Yes    [x] No       Do you have a history of angina in the last month or symptomatic arrhythmia? [] Yes    [x] No     Do you have significant central nervous system disease? [] Yes    [x] No     Have you had an EKG, labs, or chest xray in last 12 months? If yes provide copies to anesthesia   [x] Yes    [] No       [] Lab    [x] EKG    [] CXR     Have you had a stress test?     [] Yes    [x] No    When/where:    Was it normal?    [] Yes    [] No     Do you or your family have a history of Malignant Hyperthermia? [] Yes    [x] No           Do you smoke? [] Yes    [x] No      Please refrain from smoking on the day of surgery.       Patient instructed on: [x] NPO Status   [x] Meds to Take  [x] Ride Home  [x]No Jewelry/Contact Lenses/Nail [place]  [] Prep/Lax/Clear Liquids    [] Chlorhexidene     DOS Patient Needs [] HCG   [] Blood Sugar  [] PT/INR    [] T&S       COVID Vaccinated? [x] Yes    [] No                     Patient instructed on the pre-operative, intra-operative, and post-operative process? Yes  Medication instructions reviewed with patient?   Yes Nadia RUSSELL

## 2021-11-24 NOTE — PATIENT PROFILE ADULT - CAREGIVER RELATION TO PATIENT
Daughter
Implemented All Universal Safety Interventions:  [place] to call system. Call bell, personal items and telephone within reach. Instruct patient to call for assistance. Room bathroom lighting operational. Non-slip footwear when patient is off stretcher. Physically safe environment: no spills, clutter or unnecessary equipment. Stretcher in lowest position, wheels locked, appropriate side rails in place.

## 2021-12-03 NOTE — PROGRESS NOTE ADULT - PROVIDER SPECIALTY LIST ADULT
Identified pt with two pt identifiers(name and ). Reviewed record in preparation for visit and have obtained necessary documentation. All patient medications has been reviewed. Chief Complaint   Patient presents with    Thyroid Problem     seen at the request of Dr Umana Danger evaluation of thyroid nodule       Health Maintenance Due   Topic    Hepatitis C Screening     DTaP/Tdap/Td series (1 - Tdap)    Cervical cancer screen     Flu Vaccine (1)    Colorectal Cancer Screening Combo        Vitals:    21 0925   BP: 128/74   Pulse: 75   Resp: 16   Temp: 97.3 °F (36.3 °C)   TempSrc: Temporal   SpO2: 99%   Weight: 63.2 kg (139 lb 4.8 oz)   Height: 5' 2\" (1.575 m)   PainSc:   6   PainLoc: Back       4. Have you been to the ER, urgent care clinic since your last visit? Hospitalized since your last visit? No    5. Have you seen or consulted any other health care providers outside of the [address] [place] [address] since your last visit? Include any pap smears or colon screening. No      Patient is accompanied by self I have received verbal consent from Gale Angulo to discuss any/all medical information while they are present in the room.
Gastroenterology
Infectious Disease
Internal Medicine